# Patient Record
Sex: MALE | Race: BLACK OR AFRICAN AMERICAN | NOT HISPANIC OR LATINO | Employment: STUDENT | URBAN - METROPOLITAN AREA
[De-identification: names, ages, dates, MRNs, and addresses within clinical notes are randomized per-mention and may not be internally consistent; named-entity substitution may affect disease eponyms.]

---
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
## 2022-01-05 ENCOUNTER — IMMUNIZATION (OUTPATIENT)
Dept: NURSING | Facility: CLINIC | Age: 22
End: 2022-01-05
Attending: FAMILY MEDICINE
Payer: COMMERCIAL

## 2022-01-05 PROCEDURE — 0052A COVID-19,PF,PFIZER (12+ YRS): CPT

## 2022-01-05 PROCEDURE — 91305 COVID-19,PF,PFIZER (12+ YRS): CPT

## 2022-01-09 ENCOUNTER — HEALTH MAINTENANCE LETTER (OUTPATIENT)
Age: 22
End: 2022-01-09

## 2022-01-11 ENCOUNTER — DOCUMENTATION ONLY (OUTPATIENT)
Dept: LAB | Facility: CLINIC | Age: 22
End: 2022-01-11
Payer: COMMERCIAL

## 2022-01-11 NOTE — PROGRESS NOTES
"Patient has an up coming lab appointment today 1/11/2022 and is requesting \"hormone testing\". Please review and place future orders that may be needed.     Thank you  Brandi Herrera MLT ( LAB)  "

## 2022-01-11 NOTE — PROGRESS NOTES
I'm not aware of any labs needed at this time.  Please let patient know.  If this is ongoing problem from visit in Sept, I will place referral to urology.

## 2022-01-11 NOTE — PROGRESS NOTES
Sendmybag message sent to patient to notify of provider's response and recommendation for referral to Urology if having persisting problem/concerns if related to September 2021 visit.  Phone numbers in chart on demographics page, not clear if are parent numbers or directly to patient. Since he scheduled lab visit through Sendmybag page and is active on Sendmybag, message sent with this information.      Madonna Carrillo RN  [facility]

## 2022-03-31 ENCOUNTER — TELEPHONE (OUTPATIENT)
Dept: FAMILY MEDICINE | Facility: CLINIC | Age: 22
End: 2022-03-31
Payer: COMMERCIAL

## 2022-11-20 ENCOUNTER — HEALTH MAINTENANCE LETTER (OUTPATIENT)
Age: 22
End: 2022-11-20

## 2023-04-15 ENCOUNTER — HEALTH MAINTENANCE LETTER (OUTPATIENT)
Age: 23
End: 2023-04-15

## 2024-06-22 ENCOUNTER — HEALTH MAINTENANCE LETTER (OUTPATIENT)
Age: 24
End: 2024-06-22

## 2024-12-16 NOTE — NURSING NOTE
"Wilner Chery's goals for this visit include:   Chief Complaint   Patient presents with     Derm Problem     follow up for acne       He requests these members of his care team be copied on today's visit information: no    [facility]P: [facility]    Referring Provider:  No referring provider defined for this encounter.    Ht 1.77 m (5' 9.69\")   Wt 63.1 kg (139 lb 1.8 oz)   BMI 20.14 kg/m      Do you need any medication refills at today's visit? No  Jian Barrett LPN      " Patient arrives with mother and siblings. Mother states that patient developed a fever a few days after their brother was initially sick.